# Patient Record
Sex: MALE | URBAN - METROPOLITAN AREA
[De-identification: names, ages, dates, MRNs, and addresses within clinical notes are randomized per-mention and may not be internally consistent; named-entity substitution may affect disease eponyms.]

---

## 2018-10-02 PROCEDURE — 88350 IMFLUOR EA ADDL 1ANTB STN PX: CPT

## 2018-10-02 PROCEDURE — 88346 IMFLUOR 1ST 1ANTB STAIN PX: CPT

## 2018-10-04 ENCOUNTER — LAB ENCOUNTER (OUTPATIENT)
Dept: LAB | Age: 58
End: 2018-10-04
Attending: DERMATOLOGY
Payer: COMMERCIAL

## 2018-10-04 DIAGNOSIS — L12.0 BULLOUS PEMPHIGOID: Primary | ICD-10-CM

## 2018-10-11 NOTE — PROGRESS NOTES
[place],  Let's discuss this case. We may need to do a porphyria workup given the location of the blisters on the hands/arms and the DIF finding.

## 2018-10-15 NOTE — PROGRESS NOTES
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 Sometimes certain medications can cause it.  Does he iram

## 2018-10-16 NOTE — PROGRESS NOTES
Ok if pt doesn't come in tonight. Would like for pt to do 24 hour urine collection to help confirm diagnosis. Please send pt to [facility] to get urine bottle (order has been placed).  They will have directions for him but first urination of the morning goes in

## 2018-10-16 NOTE — PROGRESS NOTES
Spoke to pt, advised on path he states he did take tetracycline's about 4 years ago and does take ibuprofen at times. States no active blisters at this time.   Pt wants to know if you would still like to see him tonight

## 2018-10-19 PROCEDURE — 84120 ASSAY OF URINE PORPHYRINS: CPT | Performed by: PHYSICIAN ASSISTANT

## 2019-04-02 PROBLEM — C61 PROSTATE CANCER (HCC): Status: ACTIVE | Noted: 2019-04-02
